# Patient Record
Sex: MALE | Race: WHITE | NOT HISPANIC OR LATINO
[De-identification: names, ages, dates, MRNs, and addresses within clinical notes are randomized per-mention and may not be internally consistent; named-entity substitution may affect disease eponyms.]

---

## 2019-02-11 ENCOUNTER — RECORDS - HEALTHEAST (OUTPATIENT)
Dept: ADMINISTRATIVE | Facility: OTHER | Age: 44
End: 2019-02-11

## 2019-02-12 ENCOUNTER — HOME INFUSION (PRE-WILLOW HOME INFUSION) (OUTPATIENT)
Dept: PHARMACY | Facility: CLINIC | Age: 44
End: 2019-02-12

## 2019-02-12 ENCOUNTER — HOSPITAL ENCOUNTER (OUTPATIENT)
Dept: INTERVENTIONAL RADIOLOGY/VASCULAR | Facility: HOSPITAL | Age: 44
Discharge: HOME | End: 2019-02-12
Attending: INTERNAL MEDICINE | Admitting: RADIOLOGY

## 2019-02-12 DIAGNOSIS — C17.9: ICD-10-CM

## 2019-02-12 LAB
HGB BLD-MCNC: 11.5 G/DL (ref 14–18)
INR PPP: 1.14 (ref 0.9–1.1)
PLATELET # BLD AUTO: 164 THOU/UL (ref 140–440)

## 2019-02-12 RX ORDER — IBUPROFEN 200 MG
200 TABLET ORAL EVERY 6 HOURS PRN
Status: SHIPPED | COMMUNITY
Start: 2019-02-12

## 2019-02-12 RX ORDER — HYDROCODONE BITARTRATE AND ACETAMINOPHEN 5; 325 MG/1; MG/1
1 TABLET ORAL EVERY 4 HOURS PRN
Status: SHIPPED | COMMUNITY
Start: 2019-02-12

## 2019-02-12 ASSESSMENT — MIFFLIN-ST. JEOR: SCORE: 1370.68

## 2019-02-13 NOTE — PROGRESS NOTES
This is a recent snapshot of the patient's [facility] medical record.  For current drug dose and complete information and questions, call [phone] or In Basket pool, [facility]v home infusion ([id number])  CSN Number:  [id number]

## 2019-07-22 ENCOUNTER — HOME INFUSION (PRE-WILLOW HOME INFUSION) (OUTPATIENT)
Dept: PHARMACY | Facility: CLINIC | Age: 44
End: 2019-07-22

## 2019-07-25 NOTE — PROGRESS NOTES
This is a recent snapshot of the patient's [facility] medical record.  For current drug dose and complete information and questions, call [phone] or In Basket pool, fv home infusion ([id number])  CSN Number:  [id number]

## 2019-11-11 ENCOUNTER — RECORDS - HEALTHEAST (OUTPATIENT)
Dept: ADMINISTRATIVE | Facility: OTHER | Age: 44
End: 2019-11-11

## 2019-11-12 ENCOUNTER — RECORDS - HEALTHEAST (OUTPATIENT)
Dept: ADMINISTRATIVE | Facility: OTHER | Age: 44
End: 2019-11-12

## 2019-11-15 ENCOUNTER — HOSPITAL ENCOUNTER (OUTPATIENT)
Dept: INTERVENTIONAL RADIOLOGY/VASCULAR | Facility: HOSPITAL | Age: 44
Discharge: HOME | End: 2019-11-15
Attending: INTERNAL MEDICINE | Admitting: RADIOLOGY

## 2019-11-15 DIAGNOSIS — C17.9 ADENOCARCINOMA OF SMALL INTESTINE (H): ICD-10-CM

## 2019-11-15 RX ORDER — LORAZEPAM 0.5 MG/1
0.5 TABLET ORAL 2 TIMES DAILY
Status: SHIPPED | COMMUNITY
Start: 2019-11-15

## 2019-11-15 RX ORDER — GABAPENTIN 100 MG/1
100 CAPSULE ORAL 3 TIMES DAILY
Status: SHIPPED | COMMUNITY
Start: 2019-11-15

## 2019-11-15 ASSESSMENT — MIFFLIN-ST. JEOR: SCORE: 1370.68

## 2021-06-02 VITALS — BODY MASS INDEX: 22.2 KG/M2 | WEIGHT: 130 LBS | HEIGHT: 64 IN

## 2021-06-03 VITALS — HEIGHT: 64 IN | BODY MASS INDEX: 22.2 KG/M2 | WEIGHT: 130 LBS

## 2021-06-03 NOTE — PRE-PROCEDURE
Procedure Name: port removal  Date/Time: 11/15/2019 11:07 AM  Written consent obtained?: Yes  Risks and benefits: Risks, benefits and alternatives were discussed  Consent given by: patient  Expected level of sedation: moderate  ASA Class: Class 3- Severe systemic disease, definite functional limitations  Mallampati: Grade 2- soft palate, base of uvula, tonsillar pillars, and portion of posterior pharyngeal wall visible  Patient states understanding of procedure being performed: Yes  Patient's understanding of procedure matches consent: Yes  Appropriately NPO: yes  Lungs: lungs clear with good breath sounds bilaterally  Heart: normal heart sounds and rate  History & Physical reviewed: History and physical reviewed and no updates needed  Statement of review: I have reviewed the lab findings, diagnostic data, medications, and the plan for sedation

## 2021-06-03 NOTE — PROCEDURES
[facility]    Procedure: IR Procedure Note  Date/Time: 11/15/2019 11:51 AM  Performed by: Konstantin Whalen MD  Authorized by: Konstantin Whalen MD       Universal Protocol    Site marked: Yes    Prior images obtained and reviewed: Yes    Required items: required blood products, implants, devices, and special equipment available    Patient identity confirmed: verbally with patient, arm band, provided demographic data and hospital-assigned identification number    Reevaluation: Patient was reevaluated immediately before administering moderate or deep sedation or anesthesia    Confirmation checklist: patient's identity using two indicators, relevant allergies, procedure was appropriate and matched the consent or emergent situation and correct equipment/implants were available    Time out: Immediately prior to procedure a time out was called to verify the correct patient, procedure, equipment, support staff and site/side marked as required    Universal Protocol: Joint Commission Universal Protocol was followed    Preparation: Patient was prepped and draped in the usual sterile fashion    Anesthesia    Local anesthesia used?: Yes    Anesthesia: local infiltration    Sedation    Patient sedation: Yes    Sedation type: moderate (conscious) sedation    Vital signs: Vital signs monitored during sedation  Specimens: none  Complications: None  Condition: Stable    Post-procedure    Patient tolerance: Patient tolerated the procedure well with no immediate complications   Length of time physician present for 1:1 monitoring during sedation: 15

## 2021-06-23 NOTE — PROGRESS NOTES
"[facility] Radiology Pre-Procedure Note  Date/Time: 2/12/2019/8:46 AM    Requested Procedure:  Port placement   Requested Provider:  Dr. Melvin     HPI: Omid Arzate is a 43 y.o. 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       NPO status:  Midnight   Anticoagulation/Antiplatelets/Bleeding tendencies:  None   Antibiotics:  Ancef dose ordered pre procedure     PAST MEDICAL HISTORY:      Past Medical History:   Diagnosis Date     Cancer (H)        PAST SURGICAL HISTORY:  History reviewed. No pertinent surgical history.    ALLERGIES:  Latex, natural rubber    MEDICATIONS:  Current Outpatient Medications   Medication Sig Dispense Refill     HYDROcodone-acetaminophen 5-325 mg per tablet Take 1 tablet by mouth every 4 (four) hours as needed for pain.       ibuprofen (ADVIL,MOTRIN) 200 MG tablet Take 200 mg by mouth every 6 (six) hours as needed for pain.       Current Facility-Administered Medications   Medication Dose Route Frequency Provider Last Rate Last Dose     ceFAZolin 2 g in 100 mL in D5W (ANCEF)  2 g Intravenous 30 Min Pre-Op Alina Miranda NP         sodium chloride bacteriostatic 0.9 % injection 0.1-0.3 mL  0.1-0.3 mL Subcutaneous PRN Alina Miranda NP         sodium chloride flush 3 mL (NS)  3 mL Intravenous Line Care Alina Miranda NP           LABS:    Lab Results   Component Value Date    INR 1.14 (H) 02/12/2019     Lab Results   Component Value Date    HGB 11.5 (L) 02/12/2019     02/12/2019         EXAM:  /80   Pulse (!) 58   Temp 98.4  F (36.9  C) (Oral)   Resp 16   Ht 5' 4\" (1.626 m)   Wt 130 lb (59 kg)   SpO2 100%   BMI 22.31 kg/m    General:  Stable.  In no acute distress.  Neuro:  A&O x 3.  Moves all extremities equally.  Resp:  Lungs clear to auscultation bilaterally.  Cardio:  S1S2 and reg, without murmur, clicks or rubs.  Skin:  Upper chest clean and clear.    Motor/sensation:  No " gross motor weakness.  Sensation intact.        Pre-Sedation Assessment:  Mallampati Airway Classification: Class 1: entire tonsil clearly visble  Previous reaction to anesthesia/sedation: no  Sedation plan based on assessment: Moderate  Sleep Apnea: no  Dentures: no  COPD: no  ASA Classification: ASA 3 - Patient with moderate systemic disease with functional limitations  Comments: no hx of asthma     ASSESSMENT:  43 y.o. 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     PLAN:    Right chest port placement with sedation.      The procedure, risks and moderate sedation were discussed with patient, all questions answered and patient agrees to proceed with the procedure.   Written consent obtained.    Alina WHITLOCK, CNP  [facility]  [phone]

## 2021-06-28 NOTE — PROGRESS NOTES
"Progress Notes by Yari Valdez CNP at 11/15/2019 11:00 AM     Author: José Miguel, Yari J, CNP Service: Interventional Radiology Author Type: Nurse Practitioner    Filed: 11/15/2019 11:07 AM Date of Service: 11/15/2019 11:00 AM Status: Signed    : Yari Valdez CNP (Nurse Practitioner)         Interventional Radiology - Pre-Procedure Note:  11/15/2019    Procedure Requested: Port removal   Requested by: Dr. Melvin    History and Physical Reviewed: H&P documented within 30 days (by Dr. Melvin on 11/11/19).  I have personally reviewed the patient's medical history and have updated the medical record as necessary.    Brief HPI: Omid Arzate is a 44 y.o. 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 Patient has completed chemotherapy, no longer needs port. Presents today for port removal.      NPO: MN  ANTICOAGULANTS: None  ANTIBIOTICS: None    ALLERGIES  Latex, natural rubber    EXAM:  /83   Pulse 65   Temp 97.9  F (36.6  C) (Oral)   Resp 20   Ht 5' 4\" (1.626 m)   Wt 130 lb (59 kg)   SpO2 99%   BMI 22.31 kg/m    General: Stable. In no acute distress.  Neuro: A&O x 3.   Resp: Lungs clear to auscultation bilaterally.  Cardio: S1S2 and reg, without murmur, clicks or rubs  Skin: Without excoriations, ecchymosis, erythema, lesions or open sores on right chest.    Pre-Sedation Assessment:  Mallampati Airway Classification: Class 2: upper half of tonsil fossa visible  Previous reaction to anesthesia/sedation: no  Sedation plan based on assessment: Moderate  ASA Classification: ASA 3 - Patient with moderate systemic disease with functional limitations    ASSESSMENT/PLAN:   44 year old male with history of adenocarcinoma of the small intestine who has completed chemotherapy, no longer needs port.     RIGHT port removal with sedation     Procedure, risk/benefits, and sedation reviewed with pt/family. All questions answered. " Consent obtained. OK to proceed with above radiology procedure.     Yari Valdez, CNP  Interventional Radiology

## 2024-02-02 RX ORDER — INSULIN GLARGINE 100 [IU]/ML
10 INJECTION, SOLUTION SUBCUTANEOUS AT BEDTIME
COMMUNITY

## 2024-02-05 ENCOUNTER — ANESTHESIA EVENT (OUTPATIENT)
Dept: SURGERY | Facility: AMBULATORY SURGERY CENTER | Age: 49
End: 2024-02-05

## 2024-02-06 ENCOUNTER — HOSPITAL ENCOUNTER (OUTPATIENT)
Facility: AMBULATORY SURGERY CENTER | Age: 49
Discharge: HOME | End: 2024-02-06
Attending: COLON & RECTAL SURGERY

## 2024-02-06 ENCOUNTER — ANESTHESIA (OUTPATIENT)
Dept: SURGERY | Facility: AMBULATORY SURGERY CENTER | Age: 49
End: 2024-02-06

## 2024-02-06 VITALS
OXYGEN SATURATION: 100 % | HEIGHT: 64 IN | SYSTOLIC BLOOD PRESSURE: 119 MMHG | RESPIRATION RATE: 16 BRPM | HEART RATE: 61 BPM | BODY MASS INDEX: 22.2 KG/M2 | DIASTOLIC BLOOD PRESSURE: 80 MMHG | WEIGHT: 130 LBS | TEMPERATURE: 97 F

## 2024-02-06 DIAGNOSIS — Z12.11 ENCOUNTER FOR SCREENING COLONOSCOPY: ICD-10-CM

## 2024-02-06 LAB
COLONOSCOPY: NORMAL
GLUCOSE POCT: 165 MG/DL (ref 70–99)

## 2024-02-06 RX ORDER — HYDROMORPHONE HCL IN WATER/PF 6 MG/30 ML
0.2 PATIENT CONTROLLED ANALGESIA SYRINGE INTRAVENOUS EVERY 5 MIN PRN
Status: DISCONTINUED | OUTPATIENT
Start: 2024-02-06 | End: 2024-02-07 | Stop reason: HOSPADM

## 2024-02-06 RX ORDER — HYDROMORPHONE HCL IN WATER/PF 6 MG/30 ML
0.4 PATIENT CONTROLLED ANALGESIA SYRINGE INTRAVENOUS EVERY 5 MIN PRN
Status: DISCONTINUED | OUTPATIENT
Start: 2024-02-06 | End: 2024-02-07 | Stop reason: HOSPADM

## 2024-02-06 RX ORDER — ONDANSETRON 2 MG/ML
4 INJECTION INTRAMUSCULAR; INTRAVENOUS EVERY 30 MIN PRN
Status: DISCONTINUED | OUTPATIENT
Start: 2024-02-06 | End: 2024-02-07 | Stop reason: HOSPADM

## 2024-02-06 RX ORDER — FENTANYL CITRATE 0.05 MG/ML
25 INJECTION, SOLUTION INTRAMUSCULAR; INTRAVENOUS EVERY 5 MIN PRN
Status: DISCONTINUED | OUTPATIENT
Start: 2024-02-06 | End: 2024-02-07 | Stop reason: HOSPADM

## 2024-02-06 RX ORDER — ONDANSETRON 4 MG/1
4 TABLET, ORALLY DISINTEGRATING ORAL EVERY 30 MIN PRN
Status: DISCONTINUED | OUTPATIENT
Start: 2024-02-06 | End: 2024-02-07 | Stop reason: HOSPADM

## 2024-02-06 RX ORDER — OXYCODONE HYDROCHLORIDE 10 MG/1
10 TABLET ORAL
Status: DISCONTINUED | OUTPATIENT
Start: 2024-02-06 | End: 2024-02-07 | Stop reason: HOSPADM

## 2024-02-06 RX ORDER — KETOROLAC TROMETHAMINE 15 MG/ML
15 INJECTION, SOLUTION INTRAMUSCULAR; INTRAVENOUS
Status: DISCONTINUED | OUTPATIENT
Start: 2024-02-06 | End: 2024-02-07 | Stop reason: HOSPADM

## 2024-02-06 RX ORDER — FENTANYL CITRATE 0.05 MG/ML
50 INJECTION, SOLUTION INTRAMUSCULAR; INTRAVENOUS EVERY 5 MIN PRN
Status: DISCONTINUED | OUTPATIENT
Start: 2024-02-06 | End: 2024-02-07 | Stop reason: HOSPADM

## 2024-02-06 RX ORDER — ONDANSETRON 4 MG/1
4 TABLET, ORALLY DISINTEGRATING ORAL
Status: DISCONTINUED | OUTPATIENT
Start: 2024-02-06 | End: 2024-02-07 | Stop reason: HOSPADM

## 2024-02-06 RX ORDER — LIDOCAINE HYDROCHLORIDE 20 MG/ML
INJECTION, SOLUTION INFILTRATION; PERINEURAL PRN
Status: DISCONTINUED | OUTPATIENT
Start: 2024-02-06 | End: 2024-02-06

## 2024-02-06 RX ORDER — OXYCODONE HYDROCHLORIDE 5 MG/1
5 TABLET ORAL
Status: DISCONTINUED | OUTPATIENT
Start: 2024-02-06 | End: 2024-02-07 | Stop reason: HOSPADM

## 2024-02-06 RX ORDER — MEPERIDINE HYDROCHLORIDE 25 MG/ML
12.5 INJECTION INTRAMUSCULAR; INTRAVENOUS; SUBCUTANEOUS EVERY 5 MIN PRN
Status: DISCONTINUED | OUTPATIENT
Start: 2024-02-06 | End: 2024-02-07 | Stop reason: HOSPADM

## 2024-02-06 RX ORDER — PROPOFOL 10 MG/ML
INJECTION, EMULSION INTRAVENOUS CONTINUOUS PRN
Status: DISCONTINUED | OUTPATIENT
Start: 2024-02-06 | End: 2024-02-06

## 2024-02-06 RX ORDER — SODIUM CHLORIDE, SODIUM LACTATE, POTASSIUM CHLORIDE, CALCIUM CHLORIDE 600; 310; 30; 20 MG/100ML; MG/100ML; MG/100ML; MG/100ML
INJECTION, SOLUTION INTRAVENOUS CONTINUOUS
Status: DISCONTINUED | OUTPATIENT
Start: 2024-02-06 | End: 2024-02-07 | Stop reason: HOSPADM

## 2024-02-06 RX ORDER — PROPOFOL 10 MG/ML
INJECTION, EMULSION INTRAVENOUS PRN
Status: DISCONTINUED | OUTPATIENT
Start: 2024-02-06 | End: 2024-02-06

## 2024-02-06 RX ORDER — LIDOCAINE 40 MG/G
CREAM TOPICAL
Status: DISCONTINUED | OUTPATIENT
Start: 2024-02-06 | End: 2024-02-07 | Stop reason: HOSPADM

## 2024-02-06 RX ADMIN — PROPOFOL 40 MG: 10 INJECTION, EMULSION INTRAVENOUS at 13:48

## 2024-02-06 RX ADMIN — LIDOCAINE HYDROCHLORIDE 2.5 ML: 20 INJECTION, SOLUTION INFILTRATION; PERINEURAL at 13:47

## 2024-02-06 RX ADMIN — SODIUM CHLORIDE, SODIUM LACTATE, POTASSIUM CHLORIDE, CALCIUM CHLORIDE: 600; 310; 30; 20 INJECTION, SOLUTION INTRAVENOUS at 13:20

## 2024-02-06 RX ADMIN — PROPOFOL 160 MCG/KG/MIN: 10 INJECTION, EMULSION INTRAVENOUS at 13:47

## 2024-02-06 NOTE — H&P
Pre-Endoscopy History and Physical     Omid Arzate MRN# [id number]   YOB: 1975 Age: 48 year old     Date of Procedure: 02/06/24  Primary care provider: Giacomo Jane  Type of Endoscopy: Colonoscopy  Reason for Procedure: screening  Type of Anesthesia Anticipated: MAC    HPI:    Omid is a 48 year old male who will be undergoing the above procedure.      Prior colonoscopy: none    A history and physical has been performed. He has a history of duodenal adenocarcinoma. The patient's medications and allergies have been reviewed. The risks and benefits of the procedure and the sedation options and risks were discussed with the patient.  All questions were answered and informed consent was obtained.      He denies a personal or family history of anesthesia complications or bleeding disorders. He denies any family history of CRC or IBD.    Allergies   Allergen Reactions    Latex, Natural Rubber [Latex] Unknown      Allergy to Latex: NO   Allergy to tape: NO   Intolerances: NO     gabapentin (NEURONTIN) 100 MG capsule, [GABAPENTIN (NEURONTIN) 100 MG CAPSULE] Take 100 mg by mouth 3 (three) times a day.  HYDROcodone-acetaminophen 5-325 mg per tablet, [HYDROCODONE-ACETAMINOPHEN 5-325 MG PER TABLET] Take 1 tablet by mouth every 4 (four) hours as needed for pain.  ibuprofen (ADVIL,MOTRIN) 200 MG tablet, [IBUPROFEN (ADVIL,MOTRIN) 200 MG TABLET] Take 200 mg by mouth every 6 (six) hours as needed for pain.  insulin glargine 100 UNIT/ML pen, Inject 10 Units Subcutaneous at bedtime  LORazepam (ATIVAN) 0.5 MG tablet, [LORAZEPAM (ATIVAN) 0.5 MG TABLET] Take 0.5 mg by mouth 2 (two) times a day.  metFORMIN (GLUCOPHAGE) 1000 MG tablet, Take 500 mg by mouth 2 times daily (with meals)    No current facility-administered medications on file prior to encounter.      There is no problem list on file for this patient.       Past Medical History:   Diagnosis Date    Diabetes (H)     PONV (postoperative nausea and vomiting)      "    Past Surgical History:   Procedure Laterality Date    IR CHEST PORT PLACEMENT > 5 YRS OF AGE  2/12/2019    IR PORT PLACEMENT >5 YEARS  2/12/2019    IR PORT REMOVAL  11/15/2019    IR PORT REMOVAL Right 11/15/2019    IR PORT REMOVAL  11/15/2019       Social History     Tobacco Use    Smoking status: Never    Smokeless tobacco: Never   Substance Use Topics    Alcohol use: No       History reviewed. No pertinent family history.    REVIEW OF SYSTEMS:     5 point ROS negative except as noted above in HPI, including Gen., Resp., CV, GI &  system review.      PHYSICAL EXAM:   /74   Temp 97.4  F (36.3  C) (Temporal)   Resp 16   Ht 1.626 m (5' 4.02\")   Wt 59 kg (130 lb)   SpO2 96%   BMI 22.30 kg/m   Estimated body mass index is 22.3 kg/m  as calculated from the following:    Height as of this encounter: 1.626 m (5' 4.02\").    Weight as of this encounter: 59 kg (130 lb).   All Vitals have been reviewed.    GENERAL APPEARANCE: healthy and alert  MENTAL STATUS: alert  RESP: lungs clear to auscultation - no rales, rhonchi or wheezes  CV: regular rates and rhythm      IMPRESSION   ASA Class 2 - Mild systemic disease        PLAN:     Plan for colonoscopy. We discussed the risks, benefits and alternatives and the patient wished to proceed.    The above has been forwarded to the consulting provider.    Elisabeth Anne MD, MS, FACS, FASCRS  [facility]  Office: [phone]  2/6/2024 1:43 PM         "

## 2024-02-06 NOTE — ANESTHESIA PREPROCEDURE EVALUATION
"Anesthesia Pre-Procedure Evaluation    Patient: Omid Arzate   MRN: [id number] : 1975        Procedure : Procedure(s):  COLONOSCOPY          Past Medical History:   Diagnosis Date    Diabetes (H)     PONV (postoperative nausea and vomiting)       Past Surgical History:   Procedure Laterality Date    IR CHEST PORT PLACEMENT > 5 YRS OF AGE  2019    IR PORT PLACEMENT >5 YEARS  2019    IR PORT REMOVAL  11/15/2019    IR PORT REMOVAL Right 11/15/2019    IR PORT REMOVAL  11/15/2019      Allergies   Allergen Reactions    Latex, Natural Rubber [Latex] Unknown      Social History     Tobacco Use    Smoking status: Never    Smokeless tobacco: Never   Substance Use Topics    Alcohol use: No      Wt Readings from Last 1 Encounters:   24 59 kg (130 lb)        Anesthesia Evaluation            ROS/MED HX  ENT/Pulmonary:  - neg pulmonary ROS     Neurologic:  - neg neurologic ROS     Cardiovascular:  - neg cardiovascular ROS     METS/Exercise Tolerance: >4 METS    Hematologic:  - neg hematologic  ROS     Musculoskeletal:  - neg musculoskeletal ROS     GI/Hepatic:  - neg GI/hepatic ROS   (+)        bowel prep,            Renal/Genitourinary:  - neg Renal ROS     Endo:  - neg endo ROS   (+) type I DM,                     Psychiatric/Substance Use:  - neg psychiatric ROS     Infectious Disease:  - neg infectious disease ROS     Malignancy:  - neg malignancy ROS     Other:  - neg other ROS          Physical Exam    Airway  airway exam normal      Mallampati: I   TM distance: > 3 FB    Mouth opening: > 3 cm    Respiratory Devices and Support         Dental           Cardiovascular   cardiovascular exam normal          Pulmonary   pulmonary exam normal                OUTSIDE LABS:  CBC:   Lab Results   Component Value Date    HGB 11.5 (L) 2019     2019     BMP: No results found for: \"NA\", \"POTASSIUM\", \"CHLORIDE\", \"CO2\", \"BUN\", \"CR\", \"GLC\"  COAGS:   Lab Results   Component Value Date    INR 1.14 (H) " "02/12/2019     POC: No results found for: \"BGM\", \"HCG\", \"HCGS\"  HEPATIC: No results found for: \"ALBUMIN\", \"PROTTOTAL\", \"ALT\", \"AST\", \"GGT\", \"ALKPHOS\", \"BILITOTAL\", \"BILIDIRECT\", \"CORINE\"  OTHER: No results found for: \"PH\", \"LACT\", \"A1C\", \"PUJA\", \"PHOS\", \"MAG\", \"LIPASE\", \"AMYLASE\", \"TSH\", \"T4\", \"T3\", \"CRP\", \"SED\"    Anesthesia Plan    ASA Status:  2    NPO Status:  NPO Appropriate    Anesthesia Type: MAC.              Consents            Postoperative Care       PONV prophylaxis: Dexamethasone or Solumedrol, Ondansetron (or other 5HT-3)     Comments:               Neal Stein MD    I have reviewed the pertinent notes and labs in the chart from the past 30 days and (re)examined the patient.  Any updates or changes from those notes are reflected in this note.                  "

## 2024-02-06 NOTE — DISCHARGE INSTRUCTIONS
If you have any questions or concerns regarding your procedure, please contact SHAMA Comsb, her office number is [phone].    Discharge Instructions after Colonoscopy      Activity and Diet  You were given medicine for sedation. You may be dizzy or sleepy.  For 24 hours:   Do not drive or use heavy equipment.   Do not make important decisions.   Do not drink any alcohol.  You may return to your normal diet and medicines.    Discomfort   Air was placed in your colon during the exam in order to see it. Walking helps to pass the air.   You may take Tylenol (acetaminophen) for pain unless your doctor has told you not to.      When to call:    Call right away if you have:   Severe abdominal or chest pain not relieved with passing air.   More than 1 to 2 Tablespoons of bleeding from your rectum.   Fever above 100.6  F (37.5  C).    If you have severe pain, bleeding, or shortness of breath, go to an emergency room.    If you have questions, call:  Monday to Friday, 7 a.m. to 4:30 p.m.  [phone]

## 2024-02-06 NOTE — ANESTHESIA POSTPROCEDURE EVALUATION
Patient: Omid Arzate    Procedure: Procedure(s):  COLONOSCOPY WITH POLYPECTOMY       Anesthesia Type:  MAC    Note:  Disposition: Outpatient   Postop Pain Control: Uneventful            Sign Out: Well controlled pain   PONV: No   Neuro/Psych: Uneventful            Sign Out: Acceptable/Baseline neuro status   Airway/Respiratory: Uneventful            Sign Out: Acceptable/Baseline resp. status   CV/Hemodynamics: Uneventful            Sign Out: Acceptable CV status; No obvious hypovolemia; No obvious fluid overload   Other NRE: NONE   DID A NON-ROUTINE EVENT OCCUR? No           Last vitals:  Vitals Value Taken Time   /70 02/06/24 1430   Temp 96.8  F (36  C) 02/06/24 1418   Pulse 65 02/06/24 1431   Resp 16 02/06/24 1418   SpO2 100 % 02/06/24 1431   Vitals shown include unfiled device data.    Electronically Signed By: Neal Stein MD  February 6, 2024  2:35 PM

## 2024-02-06 NOTE — ANESTHESIA CARE TRANSFER NOTE
Patient: Omid Arzate    Procedure: Procedure(s):  COLONOSCOPY WITH POLYPECTOMY       Diagnosis: Encounter for screening colonoscopy [Z12.11]  Diagnosis Additional Information: No value filed.    Anesthesia Type:   MAC     Note:    Oropharynx: oropharynx clear of all foreign objects  Level of Consciousness: drowsy  Oxygen Supplementation: face mask  Level of Supplemental Oxygen (L/min / FiO2): 6  Independent Airway: airway patency satisfactory and stable  Dentition: dentition unchanged  Vital Signs Stable: post-procedure vital signs reviewed and stable  Report to RN Given: handoff report given  Patient transferred to: Phase II    Handoff Report: Identifed the Patient, Identified the Reponsible Provider, Reviewed the pertinent medical history, Discussed the surgical course, Reviewed Intra-OP anesthesia mangement and issues during anesthesia, Set expectations for post-procedure period and Allowed opportunity for questions and acknowledgement of understanding      Vitals:  Vitals Value Taken Time   /61 02/06/24 1418   Temp 96.8  F (36  C) 02/06/24 1418   Pulse 67    Resp 16 02/06/24 1418   SpO2 99 % 02/06/24 1418       Electronically Signed By: KAMLESH Orellana CRNA  February 6, 2024  2:20 PM